# Patient Record
Sex: MALE | Race: WHITE | Employment: FULL TIME | URBAN - METROPOLITAN AREA
[De-identification: names, ages, dates, MRNs, and addresses within clinical notes are randomized per-mention and may not be internally consistent; named-entity substitution may affect disease eponyms.]

---

## 2019-05-20 ENCOUNTER — OFFICE VISIT (OUTPATIENT)
Dept: INTERNAL MEDICINE CLINIC | Age: 64
End: 2019-05-20

## 2019-05-20 VITALS
WEIGHT: 229 LBS | RESPIRATION RATE: 18 BRPM | DIASTOLIC BLOOD PRESSURE: 66 MMHG | OXYGEN SATURATION: 95 % | SYSTOLIC BLOOD PRESSURE: 104 MMHG | HEART RATE: 62 BPM | HEIGHT: 72 IN | BODY MASS INDEX: 31.02 KG/M2 | TEMPERATURE: 98.4 F

## 2019-05-20 DIAGNOSIS — Z00.00 ENCOUNTER FOR PREVENTIVE HEALTH EXAMINATION: Primary | ICD-10-CM

## 2019-05-20 DIAGNOSIS — R73.03 PREDIABETES: ICD-10-CM

## 2019-05-20 DIAGNOSIS — M51.35 DJD (DEGENERATIVE JOINT DISEASE), THORACOLUMBAR: ICD-10-CM

## 2019-05-20 DIAGNOSIS — I48.3 TYPICAL ATRIAL FLUTTER (HCC): ICD-10-CM

## 2019-05-20 DIAGNOSIS — N20.0 CALCULUS OF KIDNEY: ICD-10-CM

## 2019-05-20 DIAGNOSIS — N40.0 BENIGN PROSTATIC HYPERPLASIA WITHOUT LOWER URINARY TRACT SYMPTOMS: ICD-10-CM

## 2019-05-20 DIAGNOSIS — K76.0 NAFLD (NONALCOHOLIC FATTY LIVER DISEASE): ICD-10-CM

## 2019-05-20 DIAGNOSIS — E78.2 MIXED HYPERLIPIDEMIA: ICD-10-CM

## 2019-05-20 PROBLEM — I48.92 ATRIAL FLUTTER (HCC): Status: ACTIVE | Noted: 2019-05-20

## 2019-05-20 PROBLEM — K57.90 DIVERTICULOSIS: Status: ACTIVE | Noted: 2019-05-20

## 2019-05-20 PROBLEM — H40.10X0 OPEN-ANGLE GLAUCOMA: Status: ACTIVE | Noted: 2019-05-20

## 2019-05-20 RX ORDER — ASPIRIN 81 MG/1
TABLET ORAL DAILY
COMMUNITY

## 2019-05-20 RX ORDER — BRIMONIDINE TARTRATE, TIMOLOL MALEATE 2; 5 MG/ML; MG/ML
1 SOLUTION/ DROPS OPHTHALMIC 2 TIMES DAILY
COMMUNITY

## 2019-05-20 NOTE — PROGRESS NOTES
Chief Complaint Patient presents with  Dizziness  
  vertigo acting up but now gone away  Shoulder Pain  
  left  Leg Swelling  
  ight 1. Have you been to the ER, urgent care clinic since your last visit? Hospitalized since your last visit? No 
 
2. Have you seen or consulted any other health care providers outside of the [facility] [place] since your last visit? Include any pap smears or colon screening.  No

## 2019-05-20 NOTE — PROGRESS NOTES
HPI:  
 
This patient presents to the office for transfer of medical care without any records forwarded for review. Information from [address] [place] [address] used as foundation for this narrative. Medication list was provided by patient along with allergies. He reports episode of vertigo 2 months ago that last for about three days. He described spinning sensation associated with unsteadiness of gait, nausea and vomiting. He presented to the  for evaluation and prescribed Meclizine. He was able to terminate the symptoms after performing the Foster maneuver (half somersault) he found on the web. This was developed by a hearing and balance specialist based in [place]. He reports clicking sound along the left shoulder with movement. This has not been associated with pain or restriction of motion. He denies any preceding injury. He has a prominent \"bulge\" along the right lower leg and had previously been evaluated by Dr. Candelaria Duque who diagnosed venous varicosity for which endovascular venous therapy was recommended. He denies any associated pain or leg swelling. He complains of numbness along the right 3rd/4th/5th toes and did not know if this was related to his sciatica. He denies any foot drop or leg weakness. There is no MRI lumbar spine on file but has documented DJD/DDD changes on prior CT imaging of the abdomen. He has history of renal colic in 7642 with findings of 2mm calculus at left UVJ with mild left hydroureteronephrosis. This appears to have been passed spontaneously as there is no record of urologic intervention. There is no stone analysis on file. He has history of atrial flutter with successful cardioversion performed by Dr. Fredy Michel in January 2016 and was briefly on NOAC. He has not had any recurrence of palpitations. His echocardiogram revealed EF of 51% with no significant valvular pathology. There is no cardiology consultation note on file. He has biopsy proven NAFLD but I do not have Dr. Donte Colon's consultation notes to confirm this diagnosis nor any data to determine if he has been screened for Hepatitis C or if he has confirmed immunity to Hepatitis A/B. He claims that he has not been scheduled for a follow up visit. He also complains that his great toes are starting to overlap his 2nd toes and was wondering what can be done. This has not been associated with pain. He continues to work full time and has also been attending to his ailing elderly mother in [facility] where he travels to assist with her care. Past Medical History:  
Diagnosis Date  Atrial flutter ([address] 5/20/2019  Mixed hyperlipidemia 5/20/2019  
 NAFLD (nonalcoholic fatty liver disease) 5/20/2019 Past Surgical History:  
Procedure Laterality Date  HX APPENDECTOMY  HX CATARACT REMOVAL    
 HX CATARACT REMOVAL    
 HX SEPTOPLASTY  HX TRABECULECTOMY  HX WISDOM TEETH EXTRACTION Current Outpatient Medications Medication Sig  
 aspirin delayed-release 81 mg tablet Take  by mouth daily.  brimonidine-timolol (COMBIGAN) 0.2-0.5 % drop ophthalmic solution Administer 1 Drop to both eyes two (2) times a day. No current facility-administered medications for this visit. Allergies and Intolerances:  
No Known Allergies \ Family History:  
Family History Problem Relation Age of Onset  Diabetes Mother  Hypertension Mother  Diabetes Father  Hypertension Father  Stroke Father  No Known Problems Sister  Arthritis-osteo Brother  Diabetes Sister Social History: He  reports that he has never smoked. He has never used smokeless tobacco.  
Social History Substance and Sexual Activity Alcohol Use Yes Comment: occass Immunization History:   Flu vaccine, Zostavax and Tdap UTD Diet:                               Regular Exercise:                       Biking, Weight Trainin Screening:                     Colonoscopy Occupational Risk:        No hazards Review of Systems Constitutional: Negative for chills, fever and weight loss. HENT: Negative for hearing loss. Eyes: Negative for blurred vision. Respiratory: Negative for cough, shortness of breath and wheezing. Cardiovascular: Positive for leg swelling. Negative for chest pain and palpitations. Gastrointestinal: Negative for blood in stool, heartburn and melena. Genitourinary: Negative for dysuria, frequency and urgency. Musculoskeletal: Positive for joint pain. Neurological: Positive for tingling. Negative for dizziness and headaches. Psychiatric/Behavioral: Negative for depression. The patient does not have insomnia. Physical:  
Visit Vitals /66 (BP 1 Location: Left arm, BP Patient Position: Sitting) Pulse 62 Temp 98.4 °F (36.9 °C) (Oral) Resp 18 Ht 6' (1.829 m) Wt 229 lb (103.9 kg) SpO2 95% BMI 31.06 kg/m² Physical Exam  
Constitutional: He is well-developed, well-nourished, and in no distress. HENT:  
Right Ear: External ear normal.  
Left Ear: External ear normal.  
Mouth/Throat: Oropharynx is clear and moist.  
Eyes: Conjunctivae are normal. No scleral icterus. Neck: No JVD present. Cardiovascular: Normal rate, regular rhythm, normal heart sounds and intact distal pulses. Exam reveals no gallop. No murmur heard. Pulmonary/Chest: Breath sounds normal.  
Abdominal: Soft. Bowel sounds are normal. He exhibits no mass. There is no tenderness. Musculoskeletal: He exhibits no edema. Arms: 
     Feet: 
 
Lymphadenopathy:  
  He has no cervical adenopathy. Neurological: He has normal sensation and normal strength. Gait normal.  
Reflex Scores: 
     Patellar reflexes are 0 on the right side and 0 on the left side. Achilles reflexes are 0 on the right side and 0 on the left side. Skin: Skin is warm. Prominent varicosity right lower leg, no palpable cord Psychiatric: Mood and affect normal.  
Vitals reviewed. Review of Data: 
Labs: 
No results found for any previous visit. Impression/Plan: 
 Patient Active Problem List  
Diagnosis  Code Venous varicosity Obtain records from Dr. Sophia Macedo Bunion deformity with overlapping toe Orthotics, consider podiatry opinion M21.619 Lumbar DJD/DDD Obtain old MRI M54.16 Left shoulder clicking likely from DJD and less likely from shoulder instability Obtain left shoulder xray M25.519  Prediabetes Repeat fasting glucose, low CHO diet R73.03  Benign prostatic hyperplasia without lower urinary tract symptoms SAUL with next visit N40.0  NAFLD (nonalcoholic fatty liver disease) Obtain records from Dr. Marleen Holliday and defer surveillance and management to him 
 K76.0  Atrial flutter ([address] with successful conversion to NSR Obtain records from Dr. Cathleen Curiel Y50.43 Cardiomyopathy (51% EF) Obtain records from Dr. Cathleen Curiel to determine if this has been addressed [address]  Mixed hyperlipidemia FLP repeated E78.2  Calculus of kidney Repeat U/A and if + blood, repeat CTU N20.0  Diverticulosis Fluid and fiber K57.90 Glaucoma Obtain records from Ophthalmology H40.10X0  Screening/Wellness Confirm immunizations and screening services (PSA and colonoscopy) Z0.00 Alyssia Waller MD

## 2019-05-25 LAB
25(OH)D3 SERPL-MCNC: 42.3 NG/ML (ref 32–100)
A-G RATIO,AGRAT: 1.2 RATIO (ref 1.1–2.6)
ALBUMIN SERPL-MCNC: 4.1 G/DL (ref 3.5–5)
ALP SERPL-CCNC: 82 U/L (ref 40–125)
ALT SERPL-CCNC: 54 U/L (ref 5–40)
ANION GAP SERPL CALC-SCNC: 11 MMOL/L
AST SERPL W P-5'-P-CCNC: 43 U/L (ref 10–37)
BILIRUB SERPL-MCNC: 0.4 MG/DL (ref 0.2–1.2)
BILIRUB UR QL: NEGATIVE
BUN SERPL-MCNC: 12 MG/DL (ref 6–22)
CALCIUM SERPL-MCNC: 9.3 MG/DL (ref 8.4–10.4)
CHLORIDE SERPL-SCNC: 106 MMOL/L (ref 98–110)
CHOLEST SERPL-MCNC: 172 MG/DL (ref 110–200)
CO2 SERPL-SCNC: 26 MMOL/L (ref 20–32)
CREAT SERPL-MCNC: 0.8 MG/DL (ref 0.8–1.6)
ERYTHROCYTE [DISTWIDTH] IN BLOOD BY AUTOMATED COUNT: 13.7 % (ref 10–15.5)
GFRAA, 66117: >60
GFRNA, 66118: >60
GLOBULIN,GLOB: 3.3 G/DL (ref 2–4)
GLUCOSE SERPL-MCNC: 142 MG/DL (ref 70–99)
GLUCOSE UR QL: NEGATIVE MG/DL
HCT VFR BLD AUTO: 44.9 % (ref 39.3–51.6)
HDLC SERPL-MCNC: 27 MG/DL (ref 40–59)
HDLC SERPL-MCNC: 6.4 MG/DL (ref 0–5)
HGB BLD-MCNC: 15.4 G/DL (ref 13.1–17.2)
HGB UR QL STRIP: NEGATIVE
HYLINE CAST, 6014: NEGATIVE /LPF
KETONES UR QL STRIP.AUTO: NEGATIVE MG/DL
LDLC SERPL CALC-MCNC: 107 MG/DL (ref 50–99)
LEUKOCYTE ESTERASE: NEGATIVE
MCH RBC QN AUTO: 30 PG (ref 26–34)
MCHC RBC AUTO-ENTMCNC: 34 G/DL (ref 31–36)
MCV RBC AUTO: 88 FL (ref 80–95)
NITRITE UR QL STRIP.AUTO: NEGATIVE
PH UR STRIP: 5 PH (ref 5–8)
PLATELET # BLD AUTO: 214 K/UL (ref 140–440)
PMV BLD AUTO: 10.6 FL (ref 9–13)
POTASSIUM SERPL-SCNC: 4.3 MMOL/L (ref 3.5–5.5)
PROT SERPL-MCNC: 7.4 G/DL (ref 6.2–8.1)
PROT UR QL STRIP: NEGATIVE MG/DL
PSA SERPL-MCNC: 0.33 NG/ML
RBC # BLD AUTO: 5.08 M/UL (ref 3.8–5.8)
RBC #/AREA URNS HPF: NORMAL /HPF
SODIUM SERPL-SCNC: 143 MMOL/L (ref 133–145)
SP GR UR: 1.02 (ref 1–1.03)
TRIGL SERPL-MCNC: 193 MG/DL (ref 40–149)
UROBILINOGEN UR STRIP-MCNC: <2 MG/DL
VLDLC SERPL CALC-MCNC: 39 MG/DL (ref 8–30)
WBC # BLD AUTO: 6 K/UL (ref 4–11)
WBC URNS QL MICRO: NORMAL /HPF (ref 0–2)

## 2019-05-30 DIAGNOSIS — E78.2 MIXED HYPERLIPIDEMIA: Primary | ICD-10-CM

## 2019-11-13 ENCOUNTER — HOSPITAL ENCOUNTER (OUTPATIENT)
Dept: CT IMAGING | Age: 64
Discharge: HOME | End: 2019-11-13
Attending: INTERNAL MEDICINE
Payer: SELF-PAY

## 2019-11-13 DIAGNOSIS — Z13.6 SCREENING FOR ISCHEMIC HEART DISEASE: ICD-10-CM

## 2019-11-13 PROCEDURE — 75571 CT HRT W/O DYE W/CA TEST: CPT

## 2019-11-19 ENCOUNTER — TELEPHONE (OUTPATIENT)
Dept: OTHER | Age: 64
End: 2019-11-19

## 2019-11-19 NOTE — TELEPHONE ENCOUNTER
Patient identity verified and matched to demographic data. Patient notified of Coronary Calcium Score of 17. Patient education provided to maintain healthy lifestyle, healthy diet, and regular exercise. Does not have a PCM at present, offered to set one up, asked for [employer], so that he was able to verify insurance coverage. Instructed patient to call me back if he needs assistance and he verbalized understanding. Cardiology consult not indicated. Patient verbalized understanding of results, patient education, and follow up care. Results mailed to patient.

## 2022-03-19 NOTE — PATIENT INSTRUCTIONS
Peripheral Vertigo>>Resolved Cholesterol>>repeat lipid level Fatty Liver>>records from Dr. Mary Rouse Diverticulosis>>Fiber Varicose veins>>Dr. Anca Franco Bunion and deviation of toes>>conservative management Tingling right 3/4/5 toes>>sciatica
Clear